# Patient Record
Sex: FEMALE | Race: ASIAN | NOT HISPANIC OR LATINO | Employment: STUDENT | URBAN - METROPOLITAN AREA
[De-identification: names, ages, dates, MRNs, and addresses within clinical notes are randomized per-mention and may not be internally consistent; named-entity substitution may affect disease eponyms.]

---

## 2024-04-23 ENCOUNTER — HOSPITAL ENCOUNTER (OUTPATIENT)
Facility: HOSPITAL | Age: 19
Setting detail: OBSERVATION
Discharge: HOME | End: 2024-04-24
Attending: EMERGENCY MEDICINE | Admitting: STUDENT IN AN ORGANIZED HEALTH CARE EDUCATION/TRAINING PROGRAM
Payer: COMMERCIAL

## 2024-04-23 DIAGNOSIS — T50.902A INTENTIONAL DRUG OVERDOSE, INITIAL ENCOUNTER: Primary | ICD-10-CM

## 2024-04-23 PROCEDURE — 99285 EMERGENCY DEPT VISIT HI MDM: CPT

## 2024-04-23 RX ORDER — ESCITALOPRAM OXALATE 20 MG/1
1 TABLET ORAL DAILY
COMMUNITY
Start: 2024-03-26

## 2024-04-23 RX ORDER — ONDANSETRON 2 MG/ML
4 INJECTION INTRAMUSCULAR; INTRAVENOUS ONCE
Status: COMPLETED | OUTPATIENT
Start: 2024-04-24 | End: 2024-04-24

## 2024-04-24 VITALS
OXYGEN SATURATION: 96 % | TEMPERATURE: 98 F | RESPIRATION RATE: 18 BRPM | BODY MASS INDEX: 19.16 KG/M2 | SYSTOLIC BLOOD PRESSURE: 128 MMHG | DIASTOLIC BLOOD PRESSURE: 87 MMHG | HEIGHT: 65 IN | WEIGHT: 115 LBS | HEART RATE: 83 BPM

## 2024-04-24 PROBLEM — D50.9 IRON DEFICIENCY ANEMIA: Status: ACTIVE | Noted: 2024-04-24

## 2024-04-24 PROBLEM — T50.902A INTENTIONAL DRUG OVERDOSE: Status: ACTIVE | Noted: 2024-04-24

## 2024-04-24 LAB
ALBUMIN SERPL-MCNC: 3.9 G/DL (ref 3.5–5.2)
ALBUMIN/GLOB SERPL: 1.7 G/DL
ALP SERPL-CCNC: 44 U/L (ref 39–117)
ALT SERPL W P-5'-P-CCNC: 11 U/L (ref 1–33)
AMPHET+METHAMPHET UR QL: NEGATIVE
ANION GAP SERPL CALCULATED.3IONS-SCNC: 10 MMOL/L (ref 5–15)
ANION GAP SERPL CALCULATED.3IONS-SCNC: 10.8 MMOL/L (ref 5–15)
APAP SERPL-MCNC: <5 MCG/ML (ref 0–30)
AST SERPL-CCNC: 15 U/L (ref 1–32)
BARBITURATES UR QL SCN: NEGATIVE
BASOPHILS # BLD AUTO: 0.02 10*3/MM3 (ref 0–0.2)
BASOPHILS # BLD AUTO: 0.03 10*3/MM3 (ref 0–0.2)
BASOPHILS NFR BLD AUTO: 0.3 % (ref 0–1.5)
BASOPHILS NFR BLD AUTO: 0.5 % (ref 0–1.5)
BENZODIAZ UR QL SCN: NEGATIVE
BILIRUB SERPL-MCNC: 0.2 MG/DL (ref 0–1.2)
BUN SERPL-MCNC: 6 MG/DL (ref 6–20)
BUN SERPL-MCNC: 7 MG/DL (ref 6–20)
BUN/CREAT SERPL: 12.8 (ref 7–25)
BUN/CREAT SERPL: 15.6 (ref 7–25)
CALCIUM SPEC-SCNC: 7.7 MG/DL (ref 8.6–10.5)
CALCIUM SPEC-SCNC: 8.3 MG/DL (ref 8.6–10.5)
CANNABINOIDS SERPL QL: NEGATIVE
CHLORIDE SERPL-SCNC: 106 MMOL/L (ref 98–107)
CHLORIDE SERPL-SCNC: 108 MMOL/L (ref 98–107)
CO2 SERPL-SCNC: 22.2 MMOL/L (ref 22–29)
CO2 SERPL-SCNC: 24 MMOL/L (ref 22–29)
COCAINE UR QL: NEGATIVE
CREAT SERPL-MCNC: 0.45 MG/DL (ref 0.57–1)
CREAT SERPL-MCNC: 0.47 MG/DL (ref 0.57–1)
DEPRECATED RDW RBC AUTO: 38.2 FL (ref 37–54)
DEPRECATED RDW RBC AUTO: 38.9 FL (ref 37–54)
EGFRCR SERPLBLD CKD-EPI 2021: 140.8 ML/MIN/1.73
EGFRCR SERPLBLD CKD-EPI 2021: 142.3 ML/MIN/1.73
EOSINOPHIL # BLD AUTO: 0.04 10*3/MM3 (ref 0–0.4)
EOSINOPHIL # BLD AUTO: 0.06 10*3/MM3 (ref 0–0.4)
EOSINOPHIL NFR BLD AUTO: 0.6 % (ref 0.3–6.2)
EOSINOPHIL NFR BLD AUTO: 1 % (ref 0.3–6.2)
ERYTHROCYTE [DISTWIDTH] IN BLOOD BY AUTOMATED COUNT: 11.7 % (ref 12.3–15.4)
ERYTHROCYTE [DISTWIDTH] IN BLOOD BY AUTOMATED COUNT: 11.7 % (ref 12.3–15.4)
ETHANOL BLD-MCNC: <10 MG/DL (ref 0–10)
ETHANOL UR QL: <0.01 %
FENTANYL UR-MCNC: NEGATIVE NG/ML
GLOBULIN UR ELPH-MCNC: 2.3 GM/DL
GLUCOSE SERPL-MCNC: 79 MG/DL (ref 65–99)
GLUCOSE SERPL-MCNC: 87 MG/DL (ref 65–99)
HCG SERPL QL: NEGATIVE
HCT VFR BLD AUTO: 33.9 % (ref 34–46.6)
HCT VFR BLD AUTO: 36.5 % (ref 34–46.6)
HGB BLD-MCNC: 11.4 G/DL (ref 12–15.9)
HGB BLD-MCNC: 12.5 G/DL (ref 12–15.9)
IMM GRANULOCYTES # BLD AUTO: 0.01 10*3/MM3 (ref 0–0.05)
IMM GRANULOCYTES # BLD AUTO: 0.01 10*3/MM3 (ref 0–0.05)
IMM GRANULOCYTES NFR BLD AUTO: 0.2 % (ref 0–0.5)
IMM GRANULOCYTES NFR BLD AUTO: 0.2 % (ref 0–0.5)
LYMPHOCYTES # BLD AUTO: 2.59 10*3/MM3 (ref 0.7–3.1)
LYMPHOCYTES # BLD AUTO: 2.67 10*3/MM3 (ref 0.7–3.1)
LYMPHOCYTES NFR BLD AUTO: 40.7 % (ref 19.6–45.3)
LYMPHOCYTES NFR BLD AUTO: 44.6 % (ref 19.6–45.3)
MAGNESIUM SERPL-MCNC: 1.8 MG/DL (ref 1.7–2.2)
MAGNESIUM SERPL-MCNC: 2 MG/DL (ref 1.7–2.2)
MCH RBC QN AUTO: 30.6 PG (ref 26.6–33)
MCH RBC QN AUTO: 30.8 PG (ref 26.6–33)
MCHC RBC AUTO-ENTMCNC: 33.6 G/DL (ref 31.5–35.7)
MCHC RBC AUTO-ENTMCNC: 34.2 G/DL (ref 31.5–35.7)
MCV RBC AUTO: 89.9 FL (ref 79–97)
MCV RBC AUTO: 90.9 FL (ref 79–97)
METHADONE UR QL SCN: NEGATIVE
MONOCYTES # BLD AUTO: 0.44 10*3/MM3 (ref 0.1–0.9)
MONOCYTES # BLD AUTO: 0.56 10*3/MM3 (ref 0.1–0.9)
MONOCYTES NFR BLD AUTO: 7.3 % (ref 5–12)
MONOCYTES NFR BLD AUTO: 8.8 % (ref 5–12)
NEUTROPHILS NFR BLD AUTO: 2.79 10*3/MM3 (ref 1.7–7)
NEUTROPHILS NFR BLD AUTO: 3.13 10*3/MM3 (ref 1.7–7)
NEUTROPHILS NFR BLD AUTO: 46.6 % (ref 42.7–76)
NEUTROPHILS NFR BLD AUTO: 49.2 % (ref 42.7–76)
NRBC BLD AUTO-RTO: 0 /100 WBC (ref 0–0.2)
NRBC BLD AUTO-RTO: 0 /100 WBC (ref 0–0.2)
OPIATES UR QL: NEGATIVE
OXYCODONE UR QL SCN: NEGATIVE
PLATELET # BLD AUTO: 215 10*3/MM3 (ref 140–450)
PLATELET # BLD AUTO: 240 10*3/MM3 (ref 140–450)
PMV BLD AUTO: 10 FL (ref 6–12)
PMV BLD AUTO: 9.9 FL (ref 6–12)
POTASSIUM SERPL-SCNC: 3.5 MMOL/L (ref 3.5–5.2)
POTASSIUM SERPL-SCNC: 3.6 MMOL/L (ref 3.5–5.2)
PROT SERPL-MCNC: 6.2 G/DL (ref 6–8.5)
QT INTERVAL: 412 MS
QT INTERVAL: 453 MS
QTC INTERVAL: 502 MS
QTC INTERVAL: 503 MS
RBC # BLD AUTO: 3.73 10*6/MM3 (ref 3.77–5.28)
RBC # BLD AUTO: 4.06 10*6/MM3 (ref 3.77–5.28)
SALICYLATES SERPL-MCNC: <0.3 MG/DL
SODIUM SERPL-SCNC: 139 MMOL/L (ref 136–145)
SODIUM SERPL-SCNC: 142 MMOL/L (ref 136–145)
WBC NRBC COR # BLD AUTO: 5.99 10*3/MM3 (ref 3.4–10.8)
WBC NRBC COR # BLD AUTO: 6.36 10*3/MM3 (ref 3.4–10.8)

## 2024-04-24 PROCEDURE — 25810000003 SODIUM CHLORIDE 0.9 % SOLUTION: Performed by: PHYSICIAN ASSISTANT

## 2024-04-24 PROCEDURE — 90791 PSYCH DIAGNOSTIC EVALUATION: CPT

## 2024-04-24 PROCEDURE — 80307 DRUG TEST PRSMV CHEM ANLYZR: CPT | Performed by: PHYSICIAN ASSISTANT

## 2024-04-24 PROCEDURE — 93010 ELECTROCARDIOGRAM REPORT: CPT | Performed by: INTERNAL MEDICINE

## 2024-04-24 PROCEDURE — 93005 ELECTROCARDIOGRAM TRACING: CPT | Performed by: NURSE PRACTITIONER

## 2024-04-24 PROCEDURE — G0378 HOSPITAL OBSERVATION PER HR: HCPCS

## 2024-04-24 PROCEDURE — 80053 COMPREHEN METABOLIC PANEL: CPT | Performed by: PHYSICIAN ASSISTANT

## 2024-04-24 PROCEDURE — 93005 ELECTROCARDIOGRAM TRACING: CPT | Performed by: PHYSICIAN ASSISTANT

## 2024-04-24 PROCEDURE — 85025 COMPLETE CBC W/AUTO DIFF WBC: CPT | Performed by: NURSE PRACTITIONER

## 2024-04-24 PROCEDURE — 80179 DRUG ASSAY SALICYLATE: CPT | Performed by: PHYSICIAN ASSISTANT

## 2024-04-24 PROCEDURE — 84703 CHORIONIC GONADOTROPIN ASSAY: CPT | Performed by: PHYSICIAN ASSISTANT

## 2024-04-24 PROCEDURE — 96374 THER/PROPH/DIAG INJ IV PUSH: CPT

## 2024-04-24 PROCEDURE — 85025 COMPLETE CBC W/AUTO DIFF WBC: CPT | Performed by: PHYSICIAN ASSISTANT

## 2024-04-24 PROCEDURE — 93005 ELECTROCARDIOGRAM TRACING: CPT | Performed by: STUDENT IN AN ORGANIZED HEALTH CARE EDUCATION/TRAINING PROGRAM

## 2024-04-24 PROCEDURE — 25810000003 SODIUM CHLORIDE 0.9 % SOLUTION: Performed by: NURSE PRACTITIONER

## 2024-04-24 PROCEDURE — 82077 ASSAY SPEC XCP UR&BREATH IA: CPT | Performed by: PHYSICIAN ASSISTANT

## 2024-04-24 PROCEDURE — 36415 COLL VENOUS BLD VENIPUNCTURE: CPT | Performed by: NURSE PRACTITIONER

## 2024-04-24 PROCEDURE — 80143 DRUG ASSAY ACETAMINOPHEN: CPT | Performed by: PHYSICIAN ASSISTANT

## 2024-04-24 PROCEDURE — 25010000002 MAGNESIUM SULFATE 2 GM/50ML SOLUTION: Performed by: STUDENT IN AN ORGANIZED HEALTH CARE EDUCATION/TRAINING PROGRAM

## 2024-04-24 PROCEDURE — 83735 ASSAY OF MAGNESIUM: CPT | Performed by: PHYSICIAN ASSISTANT

## 2024-04-24 PROCEDURE — 83735 ASSAY OF MAGNESIUM: CPT | Performed by: STUDENT IN AN ORGANIZED HEALTH CARE EDUCATION/TRAINING PROGRAM

## 2024-04-24 PROCEDURE — 25010000002 ONDANSETRON PER 1 MG: Performed by: PHYSICIAN ASSISTANT

## 2024-04-24 PROCEDURE — 96361 HYDRATE IV INFUSION ADD-ON: CPT

## 2024-04-24 RX ORDER — POLYETHYLENE GLYCOL 3350 17 G/17G
17 POWDER, FOR SOLUTION ORAL DAILY PRN
Status: DISCONTINUED | OUTPATIENT
Start: 2024-04-24 | End: 2024-04-24 | Stop reason: HOSPADM

## 2024-04-24 RX ORDER — POTASSIUM CHLORIDE 750 MG/1
40 TABLET, FILM COATED, EXTENDED RELEASE ORAL ONCE
Status: COMPLETED | OUTPATIENT
Start: 2024-04-24 | End: 2024-04-24

## 2024-04-24 RX ORDER — SODIUM CHLORIDE 9 MG/ML
40 INJECTION, SOLUTION INTRAVENOUS AS NEEDED
Status: DISCONTINUED | OUTPATIENT
Start: 2024-04-24 | End: 2024-04-24 | Stop reason: HOSPADM

## 2024-04-24 RX ORDER — MAGNESIUM SULFATE HEPTAHYDRATE 40 MG/ML
2 INJECTION, SOLUTION INTRAVENOUS ONCE
Status: COMPLETED | OUTPATIENT
Start: 2024-04-24 | End: 2024-04-24

## 2024-04-24 RX ORDER — AMOXICILLIN 250 MG
2 CAPSULE ORAL 2 TIMES DAILY PRN
Status: DISCONTINUED | OUTPATIENT
Start: 2024-04-24 | End: 2024-04-24 | Stop reason: HOSPADM

## 2024-04-24 RX ORDER — ACETAMINOPHEN 650 MG/1
650 SUPPOSITORY RECTAL EVERY 4 HOURS PRN
Status: DISCONTINUED | OUTPATIENT
Start: 2024-04-24 | End: 2024-04-24 | Stop reason: HOSPADM

## 2024-04-24 RX ORDER — SODIUM CHLORIDE 9 MG/ML
100 INJECTION, SOLUTION INTRAVENOUS CONTINUOUS
Status: DISCONTINUED | OUTPATIENT
Start: 2024-04-24 | End: 2024-04-24 | Stop reason: HOSPADM

## 2024-04-24 RX ORDER — ACETAMINOPHEN 325 MG/1
650 TABLET ORAL EVERY 4 HOURS PRN
Status: DISCONTINUED | OUTPATIENT
Start: 2024-04-24 | End: 2024-04-24 | Stop reason: HOSPADM

## 2024-04-24 RX ORDER — BISACODYL 5 MG/1
5 TABLET, DELAYED RELEASE ORAL DAILY PRN
Status: DISCONTINUED | OUTPATIENT
Start: 2024-04-24 | End: 2024-04-24 | Stop reason: HOSPADM

## 2024-04-24 RX ORDER — SODIUM CHLORIDE 0.9 % (FLUSH) 0.9 %
10 SYRINGE (ML) INJECTION EVERY 12 HOURS SCHEDULED
Status: DISCONTINUED | OUTPATIENT
Start: 2024-04-24 | End: 2024-04-24 | Stop reason: HOSPADM

## 2024-04-24 RX ORDER — ACETAMINOPHEN 160 MG/5ML
650 SOLUTION ORAL EVERY 4 HOURS PRN
Status: DISCONTINUED | OUTPATIENT
Start: 2024-04-24 | End: 2024-04-24 | Stop reason: HOSPADM

## 2024-04-24 RX ORDER — SODIUM CHLORIDE 0.9 % (FLUSH) 0.9 %
10 SYRINGE (ML) INJECTION AS NEEDED
Status: DISCONTINUED | OUTPATIENT
Start: 2024-04-24 | End: 2024-04-24 | Stop reason: HOSPADM

## 2024-04-24 RX ORDER — NITROGLYCERIN 0.4 MG/1
0.4 TABLET SUBLINGUAL
Status: DISCONTINUED | OUTPATIENT
Start: 2024-04-24 | End: 2024-04-24 | Stop reason: HOSPADM

## 2024-04-24 RX ORDER — BISACODYL 10 MG
10 SUPPOSITORY, RECTAL RECTAL DAILY PRN
Status: DISCONTINUED | OUTPATIENT
Start: 2024-04-24 | End: 2024-04-24 | Stop reason: HOSPADM

## 2024-04-24 RX ORDER — ONDANSETRON 2 MG/ML
4 INJECTION INTRAMUSCULAR; INTRAVENOUS EVERY 6 HOURS PRN
Status: DISCONTINUED | OUTPATIENT
Start: 2024-04-24 | End: 2024-04-24 | Stop reason: HOSPADM

## 2024-04-24 RX ORDER — ESCITALOPRAM OXALATE 20 MG/1
20 TABLET ORAL NIGHTLY
Status: DISCONTINUED | OUTPATIENT
Start: 2024-04-24 | End: 2024-04-24 | Stop reason: HOSPADM

## 2024-04-24 RX ADMIN — Medication 10 ML: at 09:27

## 2024-04-24 RX ADMIN — ONDANSETRON 4 MG: 2 INJECTION INTRAMUSCULAR; INTRAVENOUS at 00:08

## 2024-04-24 RX ADMIN — SODIUM CHLORIDE 100 ML/HR: 9 INJECTION, SOLUTION INTRAVENOUS at 03:33

## 2024-04-24 RX ADMIN — POTASSIUM CHLORIDE 40 MEQ: 750 TABLET, EXTENDED RELEASE ORAL at 15:49

## 2024-04-24 RX ADMIN — MAGNESIUM SULFATE HEPTAHYDRATE 2 G: 40 INJECTION, SOLUTION INTRAVENOUS at 15:55

## 2024-04-24 RX ADMIN — SODIUM CHLORIDE 1000 ML: 9 INJECTION, SOLUTION INTRAVENOUS at 00:08

## 2024-04-24 NOTE — ED NOTES
Nursing report ED to floor  Marva Wilkins  18 y.o.  female    HPI :  HPI (Adult)  Stated Reason for Visit: suicide attempt, overdose Lexapro    Chief Complaint  Chief Complaint   Patient presents with    Suicide Attempt    Drug Overdose    Marva Wilkins is a 18 y.o. female with a medical history of anxiety depression presents emergency department with an intentional drug overdose on Lexapro.  Patient reports that she had an argument with her boyfriend which triggered thoughts of abandonment and made her feel like she was going to end up alone so she attempted to hurt herself/end her life by taking 25 tablets of 20 mg Lexapro at 2200.  About 20 minutes postingestion she induced vomiting.  She says she still feels nauseated but has not vomited since then.  This is her first suicide attempt.  She has never been hospitalized for her mental illness previously.  She denies chest pain, shortness of air, or abdominal pain.  She denies homicidal ideations.  She denies any other drug or alcohol use.     Admitting doctor:   Roni Beauchamp DO    Admitting diagnosis:   The encounter diagnosis was Intentional drug overdose, initial encounter.    Code status:   Current Code Status       Date Active Code Status Order ID Comments User Context       4/24/2024 0239 CPR (Attempt to Resuscitate) [id number]  Carmen Perez, APRN ED        Question Answer    Code Status (Patient has no pulse and is not breathing) CPR (Attempt to Resuscitate)    Medical Interventions (Patient has pulse or is breathing) Full Support                    Allergies:   Patient has no known allergies.    Isolation:   No active isolations    Intake and Output  No intake or output data in the 24 hours ending 04/24/24 0337    Weight:       04/23/24  2336   Weight: 52.2 kg (115 lb)       Most recent vitals:   Vitals:    04/24/24 0132 04/24/24 0201 04/24/24 0231 04/24/24 0301   BP: 116/76 115/79 127/84 112/76   Pulse: 80 82 82 79   Resp:       Temp:       TempSrc:        SpO2: 98% 98% 97% 92%   Weight:       Height:           Active LDAs/IV Access:   Lines, Drains & Airways       Active LDAs       Name Placement date Placement time Site Days    Peripheral IV 04/23/24 Left Antecubital 04/23/24  --  Antecubital  1                    Labs (abnormal labs have a star):   Labs Reviewed   COMPREHENSIVE METABOLIC PANEL - Abnormal; Notable for the following components:       Result Value    Creatinine 0.45 (*)     Chloride 108 (*)     Calcium 7.7 (*)     All other components within normal limits    Narrative:     GFR Normal >60  Chronic Kidney Disease <60  Kidney Failure <15     CBC WITH AUTO DIFFERENTIAL - Abnormal; Notable for the following components:    RDW 11.7 (*)     All other components within normal limits   HCG, SERUM, QUALITATIVE - Normal   URINE DRUG SCREEN - Normal    Narrative:     Negative Thresholds Per Drugs Screened:    Amphetamines                 500 ng/ml  Barbiturates                 200 ng/ml  Benzodiazepines              100 ng/ml  Cocaine                      300 ng/ml  Methadone                    300 ng/ml  Opiates                      300 ng/ml  Oxycodone                    100 ng/ml  THC                           50 ng/ml  Fentanyl                       5 ng/ml      The Normal Value for all drugs tested is negative. This report includes final unconfirmed screening results to be used for medical treatment purposes only. Unconfirmed results must not be used for non-medical purposes such as employment or legal testing. Clinical consideration should be applied to any drug of abuse test, particularly when unconfirmed results are used.           ACETAMINOPHEN LEVEL - Normal   MAGNESIUM - Normal   SALICYLATE LEVEL - Normal    Narrative:     Therapeutic range for Salicylates:  3.0 - 10.0 mg/dL for antipyretic/analgesic conditions  15.0 - 30.0 mg/dL for anti-inflammatory conditions   ETHANOL   BASIC METABOLIC PANEL   CBC WITH AUTO DIFFERENTIAL   CBC AND DIFFERENTIAL     Narrative:     The following orders were created for panel order CBC & Differential.  Procedure                               Abnormality         Status                     ---------                               -----------         ------                     CBC Auto Differential[[id number]]        Abnormal            Final result                 Please view results for these tests on the individual orders.       EKG:   ECG 12 Lead Drug Monitoring   Preliminary Result   HEART RATE= 89  bpm   RR Interval= 674  ms   NH Interval= 168  ms   P Horizontal Axis= -30  deg   P Front Axis= 33  deg   QRSD Interval= 96  ms   QT Interval= 412  ms   QTcB= 502  ms   QRS Axis= 90  deg   T Wave Axis= 0  deg   - ABNORMAL ECG -   Sinus rhythm   Consider right ventricular hypertrophy   Nonspecific T abnormalities, anterior leads   Prolonged QT interval   Electronically Signed By:    Date and Time of Study: 2024-04-24 00:11:27      ECG 12 Lead Drug Monitoring    (Results Pending)       Meds given in ED:   Medications   sodium chloride 0.9 % flush 10 mL (has no administration in time range)   sodium chloride 0.9 % flush 10 mL (has no administration in time range)   sodium chloride 0.9 % infusion 40 mL (has no administration in time range)   nitroglycerin (NITROSTAT) SL tablet 0.4 mg (has no administration in time range)   sodium chloride 0.9 % infusion (100 mL/hr Intravenous New Bag 4/24/24 0333)   acetaminophen (TYLENOL) tablet 650 mg (has no administration in time range)     Or   acetaminophen (TYLENOL) 160 MG/5ML oral solution 650 mg (has no administration in time range)     Or   acetaminophen (TYLENOL) suppository 650 mg (has no administration in time range)   sennosides-docusate (PERICOLACE) 8.6-50 MG per tablet 2 tablet (has no administration in time range)     And   polyethylene glycol (MIRALAX) packet 17 g (has no administration in time range)     And   bisacodyl (DULCOLAX) EC tablet 5 mg (has no administration in time range)      And   bisacodyl (DULCOLAX) suppository 10 mg (has no administration in time range)   ondansetron (ZOFRAN) injection 4 mg (has no administration in time range)   sodium chloride 0.9 % bolus 1,000 mL (0 mL Intravenous Stopped 4/24/24 0324)   ondansetron (ZOFRAN) injection 4 mg (4 mg Intravenous Given 4/24/24 0008)       Imaging results:  No radiology results for the last day    Ambulatory status:   - adlib    Social issues:   Social History     Socioeconomic History    Marital status: Single   Tobacco Use    Smoking status: Never       Peripheral Neurovascular  Peripheral Neurovascular (Adult)  Peripheral Neurovascular WDL: WDL    Neuro Cognitive  Neuro Cognitive (Adult)  Cognitive/Neuro/Behavioral WDL: WDL    Learning  Learning Assessment (Adult)  Learning Readiness and Ability: no barriers identified    Respiratory  Respiratory (Adult)  Airway WDL: WDL  Respiratory WDL  Respiratory WDL: WDL    Abdominal Pain       Pain Assessments  Pain (Adult)  (0-10) Pain Rating: Rest: 0    NIH Stroke Scale       Carolina Paez RN  04/24/24 03:37 EDT

## 2024-04-24 NOTE — ED PROVIDER NOTES
EMERGENCY DEPARTMENT ENCOUNTER  Room Number:  03/03  PCP: Reji Mckeon MD  Independent Historians: Patient      HPI:  Chief Complaint: had concerns including Suicide Attempt and Drug Overdose.     A complete HPI/ROS/PMH/PSH/SH/FH are unobtainable due to: None    Chronic or social conditions impacting patient care (Social Determinants of Health): None      Context: Marva Wilkins is a 18 y.o. female with a medical history of anxiety depression presents emergency department with an intentional drug overdose on Lexapro.  Patient reports that she had an argument with her boyfriend which triggered thoughts of abandonment and made her feel like she was going to end up alone so she attempted to hurt herself/end her life by taking 25 tablets of 20 mg Lexapro at 2200.  About 20 minutes postingestion she induced vomiting.  She says she still feels nauseated but has not vomited since then.  This is her first suicide attempt.  She has never been hospitalized for her mental illness previously.  She denies chest pain, shortness of air, or abdominal pain.  She denies homicidal ideations.  She denies any other drug or alcohol use.        PAST MEDICAL HISTORY  Active Ambulatory Problems     Diagnosis Date Noted    No Active Ambulatory Problems     Resolved Ambulatory Problems     Diagnosis Date Noted    No Resolved Ambulatory Problems     No Additional Past Medical History         PAST SURGICAL HISTORY  History reviewed. No pertinent surgical history.      FAMILY HISTORY  History reviewed. No pertinent family history.      SOCIAL HISTORY  Social History     Socioeconomic History    Marital status: Single   Tobacco Use    Smoking status: Never         ALLERGIES  Patient has no known allergies.      REVIEW OF SYSTEMS  Included in HPI  All systems reviewed and negative except for those discussed in HPI.      PHYSICAL EXAM    I have reviewed the triage vital signs and nursing notes.    ED Triage Vitals [04/23/24 2336]   Temp Heart  Rate Resp BP SpO2   98.5 °F (36.9 °C) 102 18 140/91 100 %      Temp src Heart Rate Source Patient Position BP Location FiO2 (%)   Oral -- -- -- --       Physical Exam  Constitutional:       General: She is not in acute distress.     Appearance: Normal appearance.      Comments: No psychomotor agitation or depression observed.   HENT:      Head: Normocephalic and atraumatic.      Nose: Nose normal.      Mouth/Throat:      Mouth: Mucous membranes are moist.   Eyes:      Extraocular Movements: Extraocular movements intact.      Pupils: Pupils are equal, round, and reactive to light.   Cardiovascular:      Rate and Rhythm: Normal rate and regular rhythm.      Pulses: Normal pulses.      Heart sounds: Normal heart sounds.   Pulmonary:      Effort: Pulmonary effort is normal. No respiratory distress.      Breath sounds: Normal breath sounds. No wheezing, rhonchi or rales.   Abdominal:      General: Abdomen is flat. There is no distension.      Palpations: Abdomen is soft.      Tenderness: There is no abdominal tenderness. There is no guarding or rebound.   Musculoskeletal:         General: Normal range of motion.      Cervical back: Normal range of motion and neck supple.   Skin:     General: Skin is warm and dry.      Capillary Refill: Capillary refill takes less than 2 seconds.   Neurological:      General: No focal deficit present.      Mental Status: She is alert and oriented to person, place, and time.   Psychiatric:      Comments: Flat affect, depressed mood, sitting comfortably in bed, not actively responding to internal stimuli, no psychomotor agitation or depression.           LAB RESULTS  Recent Results (from the past 24 hour(s))   hCG, Serum, Qualitative    Collection Time: 04/24/24 12:09 AM    Specimen: Blood   Result Value Ref Range    HCG Qualitative Negative Negative   Ethanol    Collection Time: 04/24/24 12:09 AM    Specimen: Blood   Result Value Ref Range    Ethanol <10 0 - 10 mg/dL    Ethanol % <0.010 %    Acetaminophen Level    Collection Time: 04/24/24 12:09 AM    Specimen: Blood   Result Value Ref Range    Acetaminophen <5.0 0.0 - 30.0 mcg/mL   CBC Auto Differential    Collection Time: 04/24/24 12:09 AM    Specimen: Blood   Result Value Ref Range    WBC 6.36 3.40 - 10.80 10*3/mm3    RBC 4.06 3.77 - 5.28 10*6/mm3    Hemoglobin 12.5 12.0 - 15.9 g/dL    Hematocrit 36.5 34.0 - 46.6 %    MCV 89.9 79.0 - 97.0 fL    MCH 30.8 26.6 - 33.0 pg    MCHC 34.2 31.5 - 35.7 g/dL    RDW 11.7 (L) 12.3 - 15.4 %    RDW-SD 38.2 37.0 - 54.0 fl    MPV 10.0 6.0 - 12.0 fL    Platelets 240 140 - 450 10*3/mm3    Neutrophil % 49.2 42.7 - 76.0 %    Lymphocyte % 40.7 19.6 - 45.3 %    Monocyte % 8.8 5.0 - 12.0 %    Eosinophil % 0.6 0.3 - 6.2 %    Basophil % 0.5 0.0 - 1.5 %    Immature Grans % 0.2 0.0 - 0.5 %    Neutrophils, Absolute 3.13 1.70 - 7.00 10*3/mm3    Lymphocytes, Absolute 2.59 0.70 - 3.10 10*3/mm3    Monocytes, Absolute 0.56 0.10 - 0.90 10*3/mm3    Eosinophils, Absolute 0.04 0.00 - 0.40 10*3/mm3    Basophils, Absolute 0.03 0.00 - 0.20 10*3/mm3    Immature Grans, Absolute 0.01 0.00 - 0.05 10*3/mm3    nRBC 0.0 0.0 - 0.2 /100 WBC   Magnesium    Collection Time: 04/24/24 12:09 AM    Specimen: Blood   Result Value Ref Range    Magnesium 1.8 1.7 - 2.2 mg/dL   Salicylate Level    Collection Time: 04/24/24 12:09 AM    Specimen: Blood   Result Value Ref Range    Salicylate <0.3 <=30.0 mg/dL   Urine Drug Screen - Urine, Clean Catch    Collection Time: 04/24/24 12:11 AM    Specimen: Urine, Clean Catch   Result Value Ref Range    Amphet/Methamphet, Screen Negative Negative    Barbiturates Screen, Urine Negative Negative    Benzodiazepine Screen, Urine Negative Negative    Cocaine Screen, Urine Negative Negative    Opiate Screen Negative Negative    THC, Screen, Urine Negative Negative    Methadone Screen, Urine Negative Negative    Oxycodone Screen, Urine Negative Negative    Fentanyl, Urine Negative Negative   ECG 12 Lead Drug Monitoring     Collection Time: 04/24/24 12:11 AM   Result Value Ref Range    QT Interval 412 ms    QTC Interval 502 ms   Comprehensive Metabolic Panel    Collection Time: 04/24/24  1:33 AM    Specimen: Blood   Result Value Ref Range    Glucose 79 65 - 99 mg/dL    BUN 7 6 - 20 mg/dL    Creatinine 0.45 (L) 0.57 - 1.00 mg/dL    Sodium 142 136 - 145 mmol/L    Potassium 3.5 3.5 - 5.2 mmol/L    Chloride 108 (H) 98 - 107 mmol/L    CO2 24.0 22.0 - 29.0 mmol/L    Calcium 7.7 (L) 8.6 - 10.5 mg/dL    Total Protein 6.2 6.0 - 8.5 g/dL    Albumin 3.9 3.5 - 5.2 g/dL    ALT (SGPT) 11 1 - 33 U/L    AST (SGOT) 15 1 - 32 U/L    Alkaline Phosphatase 44 39 - 117 U/L    Total Bilirubin 0.2 0.0 - 1.2 mg/dL    Globulin 2.3 gm/dL    A/G Ratio 1.7 g/dL    BUN/Creatinine Ratio 15.6 7.0 - 25.0    Anion Gap 10.0 5.0 - 15.0 mmol/L    eGFR 142.3 >60.0 mL/min/1.73           MEDICATIONS GIVEN IN ER  Medications   sodium chloride 0.9 % flush 10 mL (has no administration in time range)   sodium chloride 0.9 % flush 10 mL (has no administration in time range)   sodium chloride 0.9 % infusion 40 mL (has no administration in time range)   nitroglycerin (NITROSTAT) SL tablet 0.4 mg (has no administration in time range)   sodium chloride 0.9 % infusion (has no administration in time range)   acetaminophen (TYLENOL) tablet 650 mg (has no administration in time range)     Or   acetaminophen (TYLENOL) 160 MG/5ML oral solution 650 mg (has no administration in time range)     Or   acetaminophen (TYLENOL) suppository 650 mg (has no administration in time range)   sennosides-docusate (PERICOLACE) 8.6-50 MG per tablet 2 tablet (has no administration in time range)     And   polyethylene glycol (MIRALAX) packet 17 g (has no administration in time range)     And   bisacodyl (DULCOLAX) EC tablet 5 mg (has no administration in time range)     And   bisacodyl (DULCOLAX) suppository 10 mg (has no administration in time range)   ondansetron (ZOFRAN) injection 4 mg (has no  administration in time range)   sodium chloride 0.9 % bolus 1,000 mL (1,000 mL Intravenous New Bag 4/24/24 0008)   ondansetron (ZOFRAN) injection 4 mg (4 mg Intravenous Given 4/24/24 0008)           OUTPATIENT MEDICATION MANAGEMENT:  Current Facility-Administered Medications Ordered in Epic   Medication Dose Route Frequency Provider Last Rate Last Admin    acetaminophen (TYLENOL) tablet 650 mg  650 mg Oral Q4H PRN Carmen Perez APRN        Or    acetaminophen (TYLENOL) 160 MG/5ML oral solution 650 mg  650 mg Oral Q4H PRN Carmen Perez, APRN        Or    acetaminophen (TYLENOL) suppository 650 mg  650 mg Rectal Q4H PRN Carmen Perez, APRN        sennosides-docusate (PERICOLACE) 8.6-50 MG per tablet 2 tablet  2 tablet Oral BID PRN Carmen Perez APRN        And    polyethylene glycol (MIRALAX) packet 17 g  17 g Oral Daily PRN Carmen Perez APRN        And    bisacodyl (DULCOLAX) EC tablet 5 mg  5 mg Oral Daily PRN Carmen Perez APRN        And    bisacodyl (DULCOLAX) suppository 10 mg  10 mg Rectal Daily PRN Carmen Perez, APRN        nitroglycerin (NITROSTAT) SL tablet 0.4 mg  0.4 mg Sublingual Q5 Min PRN Carmen Perez, APRN        ondansetron (ZOFRAN) injection 4 mg  4 mg Intravenous Q6H PRN Carmen Perez, APRN        sodium chloride 0.9 % flush 10 mL  10 mL Intravenous Q12H Carmen Perez, APRN        sodium chloride 0.9 % flush 10 mL  10 mL Intravenous PRN Carmen Perez, APRN        sodium chloride 0.9 % infusion 40 mL  40 mL Intravenous PRN Carmen Perez, APRN        sodium chloride 0.9 % infusion  100 mL/hr Intravenous Continuous Carmen Perez APRN         Current Outpatient Medications Ordered in Epic   Medication Sig Dispense Refill    escitalopram (LEXAPRO) 20 MG tablet Take 1 tablet by mouth Daily.                 PROGRESS, DATA ANALYSIS, CONSULTS, AND MEDICAL DECISION MAKING  ORDERS PLACED DURING THIS VISIT:  Orders Placed This  Encounter   Procedures    Comprehensive Metabolic Panel    hCG, Serum, Qualitative    Urine Drug Screen - Urine, Clean Catch    Ethanol    Acetaminophen Level    CBC Auto Differential    Magnesium    Salicylate Level    Basic Metabolic Panel    CBC Auto Differential    Diet: Regular/House; Fluid Consistency: Thin (IDDSI 0)    Vital Signs    Intake & Output    Weigh Patient    Oral Care    Place Sequential Compression Device    Maintain Sequential Compression Device    Maintain IV Access    Telemetry - Place Orders & Notify Provider of Results When Patient Experiences Acute Chest Pain, Dysrhythmia or Respiratory Distress    May Be Off Telemetry for Tests    Continuous Pulse Oximetry    Up With Assistance    Neuro Checks    Code Status and Medical Interventions:    [facility] (on-call MD unless specified) Details    Psych / Access to see    Inpatient Access Center Consult    ECG 12 Lead Drug Monitoring    ECG 12 Lead Drug Monitoring    Insert Peripheral IV    Initiate Observation Status    Legal Status 72hr Hold    CBC & Differential       All labs have been independently interpreted by me.  All radiology studies have been reviewed by me. All EKG's have been independently viewed and interpreted by me.  Discussion below represents my analysis of pertinent findings related to patient's condition, differential diagnosis, treatment plan and final disposition.    Differential diagnosis includes but is not limited to:   Intentional drug overdose, electrolyte abnormality, prolonged QT interval    ED Course:  ED Course as of 04/24/24 0258   Tue Apr 23, 2024   3049 I discussed the case with Dr. Curiel and they agree to evaluate the patient at the bedside.    [CC]   Wed Apr 24, 2024   0008 I spoke with poison control and they recommended symptomatic management and an 11 hour observation time. They do not recommend activated charcoal at this time.  [CC]   0026 EKG performed at 0011 and interpreted by me: Normal sinus rhythm with a rate  of 89, normal axis, prolonged QT interval, nonspecific T wave changes in the anterior leads, no significant ST elevation or depression  [CC]   0211 Spoke with ROBERT Morales with [facility].  Reviewed history, exam, results, treatments.  She agrees admit the patient to Dr. Beauchamp.      [CC]      ED Course User Index  [CC] Karen Morocho PA-C           AS OF 02:58 EDT VITALS:    BP - 127/84  HR - 82  TEMP - 98.5 °F (36.9 °C) (Oral)  O2 SATS - 97%      MDM:  Patient is a well-appearing 18-year-old female presents emergency department today with an intentional medication overdose.  On arrival here in the emergency department vitals are reassuring, she is afebrile.  My exam the patient has a flat affect and depressed mood but is in no acute distress.  Patient was evaluated with labs which are overall reassuring.  EKG did reveal a mildly prolonged QT interval.  After speaking with poison control they recommended observation on telemetry for 11 hours.  They did not recommend givingactivated charcoal given that the patient had already vomited since ingestion.  Since the observation time is lengthy and she will need to be watched on telemetry, will admit to the hospital for psychiatric consultation.  Patient is on a 72-hour hold.  She is stable at the time of admission.      COMPLEXITY OF CARE  The patient requires admission.        DIAGNOSIS  Final diagnoses:   Intentional drug overdose, initial encounter         DISPOSITION  ED Disposition       ED Disposition   Decision to Admit    Condition   --    Comment   Level of Care: Telemetry [5]   Diagnosis: Intentional drug overdose [650044]   Admitting Physician: ZAIDA BEAUCHAMP [[id number]]   Attending Physician: ZAIDA BEAUCHAMP [[id number]]                      Please note that portions of this document were completed with a voice recognition program.    Note Disclaimer: At [facility], we believe that sharing information builds trust and better relationships. You are receiving this note  because you recently visited [facility]. It is possible you will see health information before a provider has talked with you about it. This kind of information can be easy to misunderstand. To help you fully understand what it means for your health, we urge you to discuss this note with your provider.     Karen Morocho PA-C  04/24/24 0259

## 2024-04-24 NOTE — H&P
Patient Name:  Marva Wilkins  YOB: 2005  MRN:  [id number]  Admit Date:  4/23/2024  Patient Care Team:  Reji Mckeon MD as PCP - General (Pediatrics)      Subjective   History Present Illness     Chief Complaint   Patient presents with    Suicide Attempt    Drug Overdose       This is a 18-year-old woman with no significant past medical history presents the hospital after an intentional ingestion of approximately 20 to 25 tablets of 20 mg Lexapro tablets.  She was having issues with her ex-boyfriend which led to this impulsive act.  She has no history of substance use disorder.    GENERAL: No change in appetite or weight;   No fevers, chills, sweats.    SKIN: No nonhealing lesions.   No rashes.  HEME/LYMPH: No easy bruising, bleeding.   No swollen nodes.   EYES: No vision changes or diplopia.   ENT: No tinnitus, hearing loss, gum bleeding, epistaxis, hoarseness or dysphagia.   RESPIRATORY: No cough, shortness of breath, hemoptysis or wheezing.   CVS: No chest pain, palpitations, orthopnea, dyspnea on exertion or PND.   GI: No melena or hematochezia.   No abdominal pain.  No nausea, vomiting, constipation, diarrhea  : No lower tract obstructive symptoms, dysuria or hematuria.   MUSCULOSKELETAL: No bone pain.  No joint stiffness.   NEUROLOGICAL: No global weakness, loss of consciousness or seizures.   PSYCHIATRIC: No increased nervousness, mood changes or depression.         Personal History     History reviewed. No pertinent past medical history.  History reviewed. No pertinent surgical history.  History reviewed. No pertinent family history.  Social History     Tobacco Use    Smoking status: Never   Vaping Use    Vaping status: Some Days    Substances: Nicotine    Devices: Disposable, Pre-filled or refillable cartridge, Refillable tank, Pre-filled pod    Passive vaping exposure: Yes   Substance Use Topics    Alcohol use: Yes     Alcohol/week: 5.0 standard drinks of alcohol     Types: 5 Cans  of beer per week    Drug use: Not Currently     No current facility-administered medications on file prior to encounter.     Current Outpatient Medications on File Prior to Encounter   Medication Sig Dispense Refill    escitalopram (LEXAPRO) 20 MG tablet Take 1 tablet by mouth Daily.       No Known Allergies    Objective    Objective     Vital Signs  Temp:  [98 °F (36.7 °C)-98.5 °F (36.9 °C)] 98 °F (36.7 °C)  Heart Rate:  [] 85  Resp:  [18] 18  BP: (105-140)/(72-94) 127/85  SpO2:  [92 %-100 %] 96 %  on   ;   Device (Oxygen Therapy): room air  Body mass index is 19.14 kg/m².    Physical Exam  General: Alert and oriented x3, no acute distress.  HEENT: Normocephalic, atraumatic  Eyes: PERRL, EOMI, anicteric sclera  Lungs: Clear to auscultation bilaterally, no crackles or wheezes  CV: Regular rate and rhythm, no murmurs rubs or gallops  Abdomen: Soft, nontender, nondistended.  Normoactive bowel sounds  Extremities: No significant peripheral edema  Skin: Clean/dry/intact, no rashes  Neuro: Cranial nerves II through XII intact, no gross focal neurological deficits appreciated  Psych: Appropriate mood and affect    Results Review:  I reviewed the patient's new clinical results.  I reviewed the patient's new imaging results and agree with the interpretation.  I reviewed the patient's other test results and agree with the interpretation  I personally viewed and interpreted the patient's EKG/Telemetry data  Discussed with ED provider.    Lab Results (last 24 hours)       Procedure Component Value Units Date/Time    CBC & Differential [[id number]]  (Abnormal) Collected: 04/24/24 0009    Specimen: Blood Updated: 04/24/24 0028    Narrative:      The following orders were created for panel order CBC & Differential.  Procedure                               Abnormality         Status                     ---------                               -----------         ------                     CBC Auto Differential[[id number]]         Abnormal            Final result                 Please view results for these tests on the individual orders.    hCG, Serum, Qualitative [[id number]]  (Normal) Collected: 04/24/24 0009    Specimen: Blood Updated: 04/24/24 0047     HCG Qualitative Negative    Ethanol [[id number]] Collected: 04/24/24 0009    Specimen: Blood Updated: 04/24/24 0054     Ethanol <10 mg/dL      Ethanol % <0.010 %     Acetaminophen Level [[id number]]  (Normal) Collected: 04/24/24 0009    Specimen: Blood Updated: 04/24/24 0054     Acetaminophen <5.0 mcg/mL     CBC Auto Differential [[id number]]  (Abnormal) Collected: 04/24/24 0009    Specimen: Blood Updated: 04/24/24 0028     WBC 6.36 10*3/mm3      RBC 4.06 10*6/mm3      Hemoglobin 12.5 g/dL      Hematocrit 36.5 %      MCV 89.9 fL      MCH 30.8 pg      MCHC 34.2 g/dL      RDW 11.7 %      RDW-SD 38.2 fl      MPV 10.0 fL      Platelets 240 10*3/mm3      Neutrophil % 49.2 %      Lymphocyte % 40.7 %      Monocyte % 8.8 %      Eosinophil % 0.6 %      Basophil % 0.5 %      Immature Grans % 0.2 %      Neutrophils, Absolute 3.13 10*3/mm3      Lymphocytes, Absolute 2.59 10*3/mm3      Monocytes, Absolute 0.56 10*3/mm3      Eosinophils, Absolute 0.04 10*3/mm3      Basophils, Absolute 0.03 10*3/mm3      Immature Grans, Absolute 0.01 10*3/mm3      nRBC 0.0 /100 WBC     Magnesium [[id number]]  (Normal) Collected: 04/24/24 0009    Specimen: Blood Updated: 04/24/24 0125     Magnesium 1.8 mg/dL     Salicylate Level [[id number]]  (Normal) Collected: 04/24/24 0009    Specimen: Blood Updated: 04/24/24 0054     Salicylate <0.3 mg/dL     Narrative:      Therapeutic range for Salicylates:  3.0 - 10.0 mg/dL for antipyretic/analgesic conditions  15.0 - 30.0 mg/dL for anti-inflammatory conditions    Urine Drug Screen - Urine, Clean Catch [[id number]]  (Normal) Collected: 04/24/24 0011    Specimen: Urine, Clean Catch Updated: 04/24/24 0049     Amphet/Methamphet, Screen Negative     Barbiturates Screen, Urine Negative      Benzodiazepine Screen, Urine Negative     Cocaine Screen, Urine Negative     Opiate Screen Negative     THC, Screen, Urine Negative     Methadone Screen, Urine Negative     Oxycodone Screen, Urine Negative     Fentanyl, Urine Negative    Narrative:      Negative Thresholds Per Drugs Screened:    Amphetamines                 500 ng/ml  Barbiturates                 200 ng/ml  Benzodiazepines              100 ng/ml  Cocaine                      300 ng/ml  Methadone                    300 ng/ml  Opiates                      300 ng/ml  Oxycodone                    100 ng/ml  THC                           50 ng/ml  Fentanyl                       5 ng/ml      The Normal Value for all drugs tested is negative. This report includes final unconfirmed screening results to be used for medical treatment purposes only. Unconfirmed results must not be used for non-medical purposes such as employment or legal testing. Clinical consideration should be applied to any drug of abuse test, particularly when unconfirmed results are used.            Comprehensive Metabolic Panel [[id number]]  (Abnormal) Collected: 04/24/24 0133    Specimen: Blood Updated: 04/24/24 0200     Glucose 79 mg/dL      BUN 7 mg/dL      Creatinine 0.45 mg/dL      Sodium 142 mmol/L      Potassium 3.5 mmol/L      Comment: Slight hemolysis detected by analyzer. Result may be falsely elevated.        Chloride 108 mmol/L      CO2 24.0 mmol/L      Calcium 7.7 mg/dL      Total Protein 6.2 g/dL      Albumin 3.9 g/dL      ALT (SGPT) 11 U/L      AST (SGOT) 15 U/L      Alkaline Phosphatase 44 U/L      Total Bilirubin 0.2 mg/dL      Globulin 2.3 gm/dL      A/G Ratio 1.7 g/dL      BUN/Creatinine Ratio 15.6     Anion Gap 10.0 mmol/L      eGFR 142.3 mL/min/1.73     Narrative:      GFR Normal >60  Chronic Kidney Disease <60  Kidney Failure <15      Basic Metabolic Panel [[id number]]  (Abnormal) Collected: 04/24/24 0709    Specimen: Blood Updated: 04/24/24 0834     Glucose 87 mg/dL       BUN 6 mg/dL      Creatinine 0.47 mg/dL      Sodium 139 mmol/L      Potassium 3.6 mmol/L      Comment: Slight hemolysis detected by analyzer. Result may be falsely elevated.        Chloride 106 mmol/L      CO2 22.2 mmol/L      Calcium 8.3 mg/dL      BUN/Creatinine Ratio 12.8     Anion Gap 10.8 mmol/L      eGFR 140.8 mL/min/1.73     Narrative:      GFR Normal >60  Chronic Kidney Disease <60  Kidney Failure <15      CBC Auto Differential [[id number]]  (Abnormal) Collected: 04/24/24 0709    Specimen: Blood Updated: 04/24/24 0816     WBC 5.99 10*3/mm3      RBC 3.73 10*6/mm3      Hemoglobin 11.4 g/dL      Hematocrit 33.9 %      MCV 90.9 fL      MCH 30.6 pg      MCHC 33.6 g/dL      RDW 11.7 %      RDW-SD 38.9 fl      MPV 9.9 fL      Platelets 215 10*3/mm3      Neutrophil % 46.6 %      Lymphocyte % 44.6 %      Monocyte % 7.3 %      Eosinophil % 1.0 %      Basophil % 0.3 %      Immature Grans % 0.2 %      Neutrophils, Absolute 2.79 10*3/mm3      Lymphocytes, Absolute 2.67 10*3/mm3      Monocytes, Absolute 0.44 10*3/mm3      Eosinophils, Absolute 0.06 10*3/mm3      Basophils, Absolute 0.02 10*3/mm3      Immature Grans, Absolute 0.01 10*3/mm3      nRBC 0.0 /100 WBC     Magnesium [[id number]]  (Normal) Collected: 04/24/24 0709    Specimen: Blood Updated: 04/24/24 0954     Magnesium 2.0 mg/dL             No results found for this or any previous visit.    Imaging Results (Last 24 Hours)       ** No results found for the last 24 hours. **                ECG 12 Lead Drug Monitoring   Preliminary Result   HEART RATE= 74  bpm   RR Interval= 811  ms   CA Interval= 179  ms   P Horizontal Axis= -68  deg   P Front Axis= 53  deg   QRSD Interval= 98  ms   QT Interval= 453  ms   QTcB= 503  ms   QRS Axis= 90  deg   T Wave Axis= 71  deg   - ABNORMAL ECG -   Sinus rhythm   Borderline right axis deviation   Prolonged QT interval   Electronically Signed By:    Date and Time of Study: 2024-04-24 05:37:47      ECG 12 Lead Drug Monitoring    Preliminary Result   HEART RATE= 89  bpm   RR Interval= 674  ms   OK Interval= 168  ms   P Horizontal Axis= -30  deg   P Front Axis= 33  deg   QRSD Interval= 96  ms   QT Interval= 412  ms   QTcB= 502  ms   QRS Axis= 90  deg   T Wave Axis= 0  deg   - ABNORMAL ECG -   Sinus rhythm   Consider right ventricular hypertrophy   Nonspecific T abnormalities, anterior leads   Prolonged QT interval   Electronically Signed By:    Date and Time of Study: 2024-04-24 00:11:27                 Assessment/Plan     Active Hospital Problems    Diagnosis  POA    **Intentional drug overdose [T50.902A]  Yes    Iron deficiency anemia [D50.9]  Unknown      Resolved Hospital Problems   No resolved problems to display.     Intentional drug overdose  This was a impulsive decision.  She does not have a history of substance use disorder.  He has been seen by psychiatry in consultation.  Intensive outpatient therapy was recommended.       Mike Gifford MD  [facility]  04/24/24  12:36 EDT

## 2024-04-24 NOTE — ED PROVIDER NOTES
MD ATTESTATION NOTE    The YANI and I have discussed this patient's history, physical exam, and treatment plan.  I have reviewed the documentation and personally had a face to face interaction with the patient. I affirm the documentation and agree with the treatment and plan.  The attached note describes my personal findings.      I provided a substantive portion of the care of the patient.  I personally performed the physical exam in its entirety, and below are my findings.       Brief HPI: This patient is a 18-year-old female with a history of anxiety and depression presenting to the emergency room today with an intentional overdose of her Lexapro.  She reports that she took approximately 25 tablets of her 20 mg Lexapro medicine at 10 PM tonight.  She denies any other coingestants.  She does report that she had associated vomiting after taking the medication.      PHYSICAL EXAM  ED Triage Vitals [04/23/24 2336]   Temp Heart Rate Resp BP SpO2   98.5 °F (36.9 °C) 102 18 140/91 100 %      Temp src Heart Rate Source Patient Position BP Location FiO2 (%)   Oral -- -- -- --         GENERAL: Resting comfortably and in no acute distress, nontoxic in appearance  HENT: nares patent  EYES: no scleral icterus  CV: regular rhythm, normal rate, no M/R/G  RESPIRATORY: normal effort, lungs clear bilaterally  ABDOMEN: soft, nontender, no rebound or guarding  MUSCULOSKELETAL: no deformity, no edema  NEURO: alert, moves all extremities, follows commands  PSYCH:  calm, cooperative  SKIN: warm, dry    Vital signs and nursing notes reviewed.        Plan: Discussions with poison control recommend at at least an 11-hour observation time given her her overdose.  We will obtain labs, placed on a 72-hour hold, and ultimately admit to the hospital for medical clearance as well as psychiatric assessment.      Laboratory values were independently interpreted by myself with my interpretation showing a normal CBC, CMP, as well as UDS.        Bernard Curiel MD  04/24/24 0334

## 2024-04-24 NOTE — PROGRESS NOTES
[facility]  [phone]    ACCESS CENTER STATEMENT OF DISPOSITION      I, Marva Wilkins, was assessed in the [facility] on 4/24/2024.  I understand the recommendations below and what follow-up action is expected of me.    Inpatient, Partial Hospitalization, and Intensive Outpatient Programs:  [facility] (Inpt, PHP, IOP)  [phone]               [facility] (Inpt, PHP, IOP)  [phone]      [facility] (Inpt, PHP, IOP)  [address] [place] [address]  [phone]     [facility] (IOP only)  [phone]     [facility] ([facility]   [phone]         ________________________________  Patient/Parent/Guardian/POA Signature    ________________________________  Clinician Signature    4/24/2024  03:20 EDT

## 2024-04-24 NOTE — CASE MANAGEMENT/SOCIAL WORK
Discharge Planning Assessment  [facility]     Patient Name: Marva Wilkins  MRN: [id number]  Today's Date: 4/24/2024    Admit Date: 4/23/2024    Plan: Plan is home with outpatient services   Discharge Needs Assessment       Row Name 04/24/24 1233       Living Environment    People in Home parent(s);grandparent(s)    Current Living Arrangements home    Potentially Unsafe Housing Conditions none    In the past 12 months has the electric, gas, oil, or water company threatened to shut off services in your home? No    Primary Care Provided by self    Provides Primary Care For no one    Family Caregiver if Needed grandparent(s);parent(s)    Quality of Family Relationships involved;supportive    Able to Return to Prior Arrangements yes       Resource/Environmental Concerns    Resource/Environmental Concerns none    Transportation Concerns none       Transportation Needs    In the past 12 months, has lack of transportation kept you from medical appointments or from getting medications? no    In the past 12 months, has lack of transportation kept you from meetings, work, or from getting things needed for daily living? No       Food Insecurity    Within the past 12 months, you worried that your food would run out before you got the money to buy more. Never true    Within the past 12 months, the food you bought just didn't last and you didn't have money to get more. Never true       Transition Planning    Patient/Family Anticipates Transition to home with family    Patient/Family Anticipated Services at Transition none    Transportation Anticipated family or friend will provide       Discharge Needs Assessment    Equipment Currently Used at Home none    Concerns to be Addressed discharge planning    Anticipated Changes Related to Illness none    Provided Post Acute Provider List? N/A    Provided Post Acute Provider Quality & Resource List? N/A                   Discharge Plan       Row Name 04/24/24 6935       Plan    Plan  Plan is home with outpatient services    Plan Comments CCP spoke to patients mother at bedside  The patient is asleep.  Pt is A&O, independent with ADL's.  SHe uses no DME to ambulate  Plan is home  Pt is followed by access.  CCP following for needs                  Continued Care and Services - Admitted Since 4/23/2024    No active coordination exists for this encounter.          Demographic Summary    No documentation.                  Functional Status    No documentation.                  Psychosocial    No documentation.                  Abuse/Neglect    No documentation.                  Legal    No documentation.                  Substance Abuse    No documentation.                  Patient Forms    No documentation.                     Vinita Henriquez RN
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
## 2024-04-24 NOTE — ED NOTES
"Karne HODGE spoke to poison control who reported     \" they recommended symptomatic management and an 11 hours observation time. They do not recommend activated charcoal at this time. \"          "

## 2024-04-25 LAB
QT INTERVAL: 434 MS
QTC INTERVAL: 495 MS

## 2024-04-26 ENCOUNTER — OFFICE VISIT (OUTPATIENT)
Dept: PSYCHIATRY | Facility: HOSPITAL | Age: 19
End: 2024-04-26
Payer: COMMERCIAL

## 2024-04-26 DIAGNOSIS — F33.1 MAJOR DEPRESSIVE DISORDER, RECURRENT EPISODE, MODERATE: Primary | ICD-10-CM

## 2024-04-26 PROCEDURE — S9480 INTENSIVE OUTPATIENT PSYCHIA: HCPCS | Performed by: SOCIAL WORKER

## 2024-04-26 NOTE — PROGRESS NOTES
Mood at 3-4 with 10 being the worse and found listening to others the most helpful.  Symptoms include:    Feeling sad or empty   Trouble with concentration, memory, or making decisions  Fatigue or loss of energy  Loss of interest in things you usually like to do  Easily tearing up/crying  Increased Irritabliliy  Easily distracted  An increase/decrease in normal appetite  Changes in normal sleep patterns (increase, decrease, or broken hoours of sleep)  Racing thoughts    No SI.  Appropriate goals.  Patient does not have a psychiatrist.  She saw Dr. Terrazas in the hospital and was comfortable with him.

## 2024-04-26 NOTE — TREATMENT PLAN
Multi-Disciplinary Problems (from Behavioral Health Treatment Plan)      Active Problems       Problem: Anxiety  Start Date: 04/26/24      Problem Details: The patient self-scales this problem as a 4 with 10 being the worst.          Goal Priority Start Date Expected End Date End Date    Patient will develop and implement behavioral and cognitive strategies to reduce anxiety and irrational fears. -- 04/26/24 -- --    Goal Details: Progress toward goal:  Not appropriate to rate progress toward goal since this is the initial treatment plan.          Goal Intervention Frequency Start Date End Date    Help patient explore past emotional issues in relation to present anxiety. Weekly 04/26/24 --    Intervention Details: Duration of treatment until until discharged.          Goal Intervention Frequency Start Date End Date    Help patient develop an awareness of their cognitive and physical responses to anxiety. Weekly 04/26/24 --    Intervention Details: Duration of treatment until until discharged.                  Problem: Depression  Start Date: 04/26/24      Problem Details: The patient self-scales this problem as a 4 with 10 being the worst.          Goal Priority Start Date Expected End Date End Date    Patient will demonstrate the ability to initiate new constructive life skills outside of sessions on a consistent basis. -- 04/26/24 -- --    Goal Details: Progress toward goal:  Not appropriate to rate progress toward goal since this is the initial treatment plan.          Goal Intervention Frequency Start Date End Date    Assist patient in setting attainable activities of daily living goals. PRN 04/26/24 --      Goal Intervention Frequency Start Date End Date    Provide education about depression Weekly 04/26/24 --    Intervention Details: Duration of treatment until until discharged.          Goal Intervention Frequency Start Date End Date    Assist patient in developing healthy coping strategies. Weekly 04/26/24  --    Intervention Details: Duration of treatment until until discharged.                          Reviewed By       Lion Barraza, LPAT 04/26/24 3637                     I have discussed and reviewed this treatment plan with the patient.  It has been printed for signatures.

## 2024-04-26 NOTE — PROGRESS NOTES
Mental Health Awareness Class   (10:45am-11:45am)  Information/activity     Forgiveness    Instructor Patricia Dickerson LCSW     13:05 EDT     Patient Response Quiet  Patient was quiet but attentive.  This was her first day in program.

## 2024-04-26 NOTE — PROGRESS NOTES
Psych IOP  Group note  Observations:    Engaged in Activity / Process and Self -disclosed: Yes  Applies Topic to self: Yes  Able to give Constructive Feedback: Yes  Affect: blunted and anxious  Degree of Insightful Thinking 4  Notes:  This was pt's first day in the group. Pt was open about recent overdose and described long-term depression and anxiety. Pt also related to peers about change of environments with family and friends as moved from different state. Pt voiced desire to stay busy and described plan to work part of the day today to see friends and not be under the control of family. Pt was able to accept peer feedback and support.      Lion Barraza LPAT

## 2024-04-26 NOTE — CASE MANAGEMENT/SOCIAL WORK
Case Management Discharge Note      Final Note: home no needs    Provided Post Acute Provider List?: N/A  Provided Post Acute Provider Quality & Resource List?: N/A    Selected Continued Care - Discharged on 4/24/2024 Admission date: 4/23/2024 - Discharge disposition: Home or Self Care      Destination    No services have been selected for the patient.                Durable Medical Equipment    No services have been selected for the patient.                Dialysis/Infusion    No services have been selected for the patient.                Home Medical Care    No services have been selected for the patient.                Therapy    No services have been selected for the patient.                Community Resources    No services have been selected for the patient.                Community & DME    No services have been selected for the patient.                    Transportation Services  Private: Car    Final Discharge Disposition Code: 01 - home or self-care

## 2024-04-29 ENCOUNTER — OFFICE VISIT (OUTPATIENT)
Dept: PSYCHIATRY | Facility: HOSPITAL | Age: 19
End: 2024-04-29
Payer: COMMERCIAL

## 2024-04-29 DIAGNOSIS — F33.1 MAJOR DEPRESSIVE DISORDER, RECURRENT EPISODE, MODERATE: Primary | ICD-10-CM

## 2024-04-29 PROCEDURE — S9480 INTENSIVE OUTPATIENT PSYCHIA: HCPCS | Performed by: SOCIAL WORKER

## 2024-04-29 NOTE — PROGRESS NOTES
Psych IOP  Group note  Observations:    Engaged in Activity / Process and Self -disclosed: Yes  Applies Topic to self: Yes  Able to give Constructive Feedback: No  Affect:  appropriate to situation   Degree of Insightful Thinking 6  Notes:  Pt engaged in morning reading prior to group and processed how she related to not always getting validation for hard work but recognizes the importance of validating self. Pt shared of feeling good about how hard she works at her job and how it helps to make others' job easier. Pt able to recognize acknowledgement of this in her tip out and from her manager on a really busy day. Pt mostly listened for the rest of group but remained attentive.       Kimberly Kelley, JOHNW

## 2024-04-29 NOTE — PROGRESS NOTES
Mood at 1 with 10 being the worse and found listening to others helpful.  Symptoms include:    Easily distracted  An increase/decrease in normal appetite  Changes in normal sleep patterns (increase, decrease, or broken hoours of sleep)    No SI.  Patient seems much brighter and looking forward to socializing over the weekend.

## 2024-04-29 NOTE — PROGRESS NOTES
Other     Information/activity     6901-1198 Depression - Gave handout of the PET scan of depressed and not depressed brain, showed the videos I had a black dog, his name was depression; and Living with a black dog. Processed the information with the group.    Instructor CLARA Mendoza     14:16 EDT     Patient Response Good participation  Pt was attentive to the material, shared own depression and how related to peers.

## 2024-04-30 ENCOUNTER — OFFICE VISIT (OUTPATIENT)
Dept: PSYCHIATRY | Facility: HOSPITAL | Age: 19
End: 2024-04-30
Payer: COMMERCIAL

## 2024-04-30 DIAGNOSIS — F33.1 MAJOR DEPRESSIVE DISORDER, RECURRENT EPISODE, MODERATE: Primary | ICD-10-CM

## 2024-04-30 PROCEDURE — S9480 INTENSIVE OUTPATIENT PSYCHIA: HCPCS | Performed by: SOCIAL WORKER

## 2024-04-30 NOTE — PROGRESS NOTES
Mental Health Awareness Class   (11:00am-11:45am)  Information/activity     Using the Serenity Prayer for Problem Solving    Instructor Patricia Dickerson LCSW     15:35 EDT     Patient Response Quiet  Patient was quiet but attentive.  Still new to the group.

## 2024-04-30 NOTE — PROGRESS NOTES
Mood at 1-2 with 10 being the worse and found listening to others and talking and being open about my feelings to be most helpful today.     Fatigue or loss of energy  Easily distracted  An increase/decrease in normal appetite  Changes in normal sleep patterns (increase, decrease, or broken hoours of sleep)    No SI and appropriate goals for the day.

## 2024-04-30 NOTE — PROGRESS NOTES
Psych IOP  Group note  Observations:    Engaged in Activity / Process and Self -disclosed: Yes  Applies Topic to self: Yes  Able to give Constructive Feedback: Yes  Affect:  appropriate to situation   Degree of Insightful Thinking 7  Notes:  Pt engaged and listened to peers. Pt shared of hanging out with some friends yesterday and being able to advocate for herself in sharing her feelings and the experience of her OD and going to the hospital. Pt shared that it felt good to express herself in that way as she would have normally held her thoughts and feelings in. Pt noted that the response from her friends was supportive and they were glad to hear her share more honestly. Pt related to peer about losing someone to suicide and shared the impact.       JOHN CisnerosW

## 2024-05-01 ENCOUNTER — OFFICE VISIT (OUTPATIENT)
Dept: PSYCHIATRY | Facility: HOSPITAL | Age: 19
End: 2024-05-01
Payer: COMMERCIAL

## 2024-05-01 DIAGNOSIS — F33.1 MAJOR DEPRESSIVE DISORDER, RECURRENT EPISODE, MODERATE: Primary | ICD-10-CM

## 2024-05-01 PROCEDURE — S9480 INTENSIVE OUTPATIENT PSYCHIA: HCPCS | Performed by: ART THERAPIST

## 2024-05-01 NOTE — PROGRESS NOTES
Psych IOP  Group note  Observations:    Engaged in Activity / Process and Self -disclosed: Yes  Applies Topic to self: Yes  Able to give Constructive Feedback: Yes  Affect: blunted  Degree of Insightful Thinking 5  Notes:  Pt used markers on paper as complied with the [employer] art therapy task. Pt focused on the task and shared when encouraged. Pt described self as a social butterfly and related to peer about this. Pt described life in a sorority as the . Pt was unsure of how the created images related to self and was still able to describe the different aspects of self. Pt shared feeling safe in the group.       Lion Barraza LPAT

## 2024-05-01 NOTE — PROGRESS NOTES
Mental Health Awareness Class   (10:45am-11:45am)  Information/activity     The Twelve Steps for the Rest of Us    Instructor Patricia Dickerson LCSW     16:22 EDT     Patient Response Quiet  Patient was quiet but attentive.

## 2024-05-01 NOTE — PROGRESS NOTES
Mood at 1 with 10 being the worse and found the class on the Twelve Steps the most helpful.  Symptoms include:     Easily distracted    No SI.  Appropriate goals.

## 2024-05-02 ENCOUNTER — OFFICE VISIT (OUTPATIENT)
Dept: PSYCHIATRY | Facility: HOSPITAL | Age: 19
End: 2024-05-02
Payer: COMMERCIAL

## 2024-05-02 DIAGNOSIS — F33.1 MAJOR DEPRESSIVE DISORDER, RECURRENT EPISODE, MODERATE: Primary | ICD-10-CM

## 2024-05-02 PROCEDURE — S9480 INTENSIVE OUTPATIENT PSYCHIA: HCPCS | Performed by: ART THERAPIST

## 2024-05-02 NOTE — PROGRESS NOTES
Psych IOP  Group note  Observations:    Engaged in Activity / Process and Self -disclosed: Yes  Applies Topic to self: Yes  Able to give Constructive Feedback: Yes  Affect: blunted  Degree of Insightful Thinking 4  Notes:  Pt shared in the group discussion when encouraged. Pt shared of challenges with getting close to friends as has the history of moving often when growing up. Pt did described one friend pt considers a sister because they are so close and continue to have good contact. Pt related to peer as they described having a schedule to help with possible down time. Pt described liking working after IOP and hopes with work more over the weekend.       Lion Barraza, LPAT

## 2024-05-02 NOTE — PROGRESS NOTES
Teaching Record:  Information / activity:  Alcohol Education, Process Addictions, and Marijuana Education    Moderate participation   [id number]      Sofia Smith, JOHNW

## 2024-05-02 NOTE — PROGRESS NOTES
S: The patient is now enrolled in the intensive outpatient program after having been seen by this physician in the main hospital.  She is bright and euthymic when seen today and offers no new complaints.  She reports that she is benefiting greatly from her group participation.    O: Alert and oriented all spheres.  Mood euthymic affect incurrent.  Speech: Coherent.  No suicidal or homicidal ideation psychotic features.  Judgement and insight intact    A: Major depression disorder single episode moderate    P: Continuing current treatment and participation in IOP.

## 2024-05-02 NOTE — PROGRESS NOTES
Mood at 1 with 10 being the worse and shared having a different perspective was most helpful today.    Symptoms include:    Easily distracted  An increase/decrease in normal appetite  Changes in normal sleep patterns (increase, decrease, or broken hoours of sleep)    Denied SI.  Shared appropriate goals. Plans to return to [facility] Friday, May 3.

## 2024-05-03 ENCOUNTER — OFFICE VISIT (OUTPATIENT)
Dept: PSYCHIATRY | Facility: HOSPITAL | Age: 19
End: 2024-05-03
Payer: COMMERCIAL

## 2024-05-03 DIAGNOSIS — F33.1 MAJOR DEPRESSIVE DISORDER, RECURRENT EPISODE, MODERATE: Primary | ICD-10-CM

## 2024-05-03 PROCEDURE — S9480 INTENSIVE OUTPATIENT PSYCHIA: HCPCS | Performed by: SOCIAL WORKER

## 2024-05-03 NOTE — PROGRESS NOTES
Other     Information/activity     [id number] Forgiveness and Trust - Watched the videos Forgiveness is no the same as reconciliation, and Sidra Juarez reveals the 7 secrets of trust: An absolute must-see speech! And gave the handout BRAVING- The anatomy of trust by Sidra Juarez; processed with the group    In[facility], [facility]     11:25 EDT     Patient Response Good participation  Pt focused on the topic, shared of ways to deal with forgiveness and trust this weekend and related to peers.
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
## 2024-05-03 NOTE — PROGRESS NOTES
Mood at 1 with 10 being the worse and shared the group interactions was most helpful today.    Symptoms include:    Easily distracted  An increase/decrease in normal appetite  Changes in normal sleep patterns (increase, decrease, or broken hoours of sleep)    Denied SI.  Shared appropriate goals. Plans to return to [facility] Monday, May 6.

## 2024-05-06 ENCOUNTER — OFFICE VISIT (OUTPATIENT)
Dept: PSYCHIATRY | Facility: HOSPITAL | Age: 19
End: 2024-05-06
Payer: COMMERCIAL

## 2024-05-06 DIAGNOSIS — F33.1 MAJOR DEPRESSIVE DISORDER, RECURRENT EPISODE, MODERATE: Primary | ICD-10-CM

## 2024-05-06 PROCEDURE — S9480 INTENSIVE OUTPATIENT PSYCHIA: HCPCS | Performed by: SOCIAL WORKER

## 2024-05-07 ENCOUNTER — OFFICE VISIT (OUTPATIENT)
Dept: PSYCHIATRY | Facility: HOSPITAL | Age: 19
End: 2024-05-07
Payer: COMMERCIAL

## 2024-05-07 DIAGNOSIS — F33.1 MAJOR DEPRESSIVE DISORDER, RECURRENT EPISODE, MODERATE: Primary | ICD-10-CM

## 2024-05-07 PROCEDURE — S9480 INTENSIVE OUTPATIENT PSYCHIA: HCPCS | Performed by: SOCIAL WORKER

## 2024-05-08 ENCOUNTER — OFFICE VISIT (OUTPATIENT)
Dept: PSYCHIATRY | Facility: HOSPITAL | Age: 19
End: 2024-05-08
Payer: COMMERCIAL

## 2024-05-08 DIAGNOSIS — F33.1 MAJOR DEPRESSIVE DISORDER, RECURRENT EPISODE, MODERATE: Primary | ICD-10-CM

## 2024-05-08 PROCEDURE — S9480 INTENSIVE OUTPATIENT PSYCHIA: HCPCS | Performed by: SOCIAL WORKER

## 2024-05-09 ENCOUNTER — OFFICE VISIT (OUTPATIENT)
Dept: PSYCHIATRY | Facility: HOSPITAL | Age: 19
End: 2024-05-09
Payer: COMMERCIAL

## 2024-05-09 DIAGNOSIS — F33.1 MAJOR DEPRESSIVE DISORDER, RECURRENT EPISODE, MODERATE: Primary | ICD-10-CM

## 2024-05-09 PROCEDURE — S9480 INTENSIVE OUTPATIENT PSYCHIA: HCPCS | Performed by: SOCIAL WORKER

## 2024-05-10 ENCOUNTER — OFFICE VISIT (OUTPATIENT)
Dept: PSYCHIATRY | Facility: HOSPITAL | Age: 19
End: 2024-05-10
Payer: COMMERCIAL

## 2024-05-10 DIAGNOSIS — F33.1 MAJOR DEPRESSIVE DISORDER, RECURRENT EPISODE, MODERATE: Primary | ICD-10-CM

## 2024-05-10 PROCEDURE — S9480 INTENSIVE OUTPATIENT PSYCHIA: HCPCS | Performed by: SOCIAL WORKER

## 2024-05-13 ENCOUNTER — OFFICE VISIT (OUTPATIENT)
Dept: PSYCHIATRY | Facility: HOSPITAL | Age: 19
End: 2024-05-13
Payer: COMMERCIAL

## 2024-05-13 DIAGNOSIS — F33.1 MAJOR DEPRESSIVE DISORDER, RECURRENT EPISODE, MODERATE: Primary | ICD-10-CM

## 2024-05-13 PROCEDURE — S9480 INTENSIVE OUTPATIENT PSYCHIA: HCPCS | Performed by: SOCIAL WORKER

## 2024-05-13 NOTE — PROGRESS NOTES
Mood at 1 with 10 being the worse and found just listening to be most helpful today.     Easily distracted  An increase/decrease in normal appetite  Changes in normal sleep patterns (increase, decrease, or broken hoours of sleep)  Racing thoughts    No SI and appropriate goals for the day. Pt needs a referral for a psychiatrist.

## 2024-05-13 NOTE — PROGRESS NOTES
Mental Health Awareness Class   (0416-0716)  Information/activity     Stress Management Toolkit    Instructor Patricia Dickerson LCSW     11:03 EDT     Patient Response Quiet   Patient needed to be reminded to put her phone away during class.  Sometimes it is difficult to tell if she is attending to the class discussion.

## 2024-05-13 NOTE — PROGRESS NOTES
Psych IOP  Group note  Observations:    Engaged in Activity / Process and Self -disclosed: No  Applies Topic to self: No  Able to give Constructive Feedback: No  Affect:  appropriate to situation   Degree of Insightful Thinking 6  Notes:  Pt did not request time for self today and noted feeling tired from a busy work weekend. Pt listened quietly to peers.     JOHN CisnerosW

## 2024-05-14 ENCOUNTER — OFFICE VISIT (OUTPATIENT)
Dept: PSYCHIATRY | Facility: HOSPITAL | Age: 19
End: 2024-05-14
Payer: COMMERCIAL

## 2024-05-14 DIAGNOSIS — F33.1 MAJOR DEPRESSIVE DISORDER, RECURRENT EPISODE, MODERATE: Primary | ICD-10-CM

## 2024-05-14 PROCEDURE — S9480 INTENSIVE OUTPATIENT PSYCHIA: HCPCS | Performed by: SOCIAL WORKER

## 2024-05-14 NOTE — PROGRESS NOTES
Mood at 2 with 10 being the worse and found listening and getting feedback to be most helpful today.     Easily distracted  An increase/decrease in normal appetite  Changes in normal sleep patterns (increase, decrease, or broken hoours of sleep)  Racing thoughts    No SI and appropriate goals for the day.

## 2024-05-14 NOTE — PROGRESS NOTES
Mental Health Awareness Class   ([id number])  Information/activity     Managing Anger    Instructor Patricia Dickerson LCSW     13:22 EDT     Patient Response Quiet   Did not participate in class discussion. Appeared distracted.

## 2024-05-14 NOTE — PROGRESS NOTES
Progress note:  Gave patient referrals for med management following discharge:  [facility], [facility], and [facility].  Also, for ongoing outpatient therapy:  [facility] and [facility].  Will also encourage patient to try [facility]  and to return for continuing care.
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
## 2024-05-15 ENCOUNTER — OFFICE VISIT (OUTPATIENT)
Dept: PSYCHIATRY | Facility: HOSPITAL | Age: 19
End: 2024-05-15
Payer: COMMERCIAL

## 2024-05-15 DIAGNOSIS — F33.1 MAJOR DEPRESSIVE DISORDER, RECURRENT EPISODE, MODERATE: Primary | ICD-10-CM

## 2024-05-15 PROCEDURE — S9480 INTENSIVE OUTPATIENT PSYCHIA: HCPCS | Performed by: SOCIAL WORKER

## 2024-05-15 NOTE — PROGRESS NOTES
IOP  Group note  Observations:    Engaged in Activity / Process and Self -disclosed: Yes  Applies Topic to self: Yes  Able to give Constructive Feedback: No  Affect: flat  Degree of Insightful Thinking 6  Notes:  Difficult to assess degree of insight as patient only shared when called upon and had seemed very distracted prior to.  She will state that she likes group and finds it helpful but she often appears distracted.      Patricia Dickerson, LCSW

## 2024-05-15 NOTE — PROGRESS NOTES
Mood at 1 with 10 being the worse and found listening to be most helpful today.     Easily distracted  An increase/decrease in normal appetite  Changes in normal sleep patterns (increase, decrease, or broken hoours of sleep)  Racing thoughts    No SI and appropriate goals for the day. Therapist spoke with pt about being more attentive in groups and classes and not working on crosswords or being on phone. Pt shared understanding of this.

## 2024-05-15 NOTE — PROGRESS NOTES
[id number] Psych IOP Class    Class topic: self-forgiveness and letting go of regret     Class participation: moderate; pt appeared to be listening and following along at times but also working on a crossword puzzle at times. Pt did not contribute to group discussion on topic. Therapist spoke with pt about this at wrap-up.

## 2024-05-16 ENCOUNTER — OFFICE VISIT (OUTPATIENT)
Dept: PSYCHIATRY | Facility: HOSPITAL | Age: 19
End: 2024-05-16
Payer: COMMERCIAL

## 2024-05-16 DIAGNOSIS — F33.1 MAJOR DEPRESSIVE DISORDER, RECURRENT EPISODE, MODERATE: Primary | ICD-10-CM

## 2024-05-16 PROCEDURE — S9480 INTENSIVE OUTPATIENT PSYCHIA: HCPCS | Performed by: SOCIAL WORKER

## 2024-05-16 NOTE — PROGRESS NOTES
Psych IOP  Group note  Observations:    Engaged in Activity / Process and Self -disclosed: Yes  Applies Topic to self: No  Able to give Constructive Feedback: No  Affect:  consistent with mood  Degree of Insightful Thinking 6  Notes:  Pt did not request time for self today but seemed more attentive. Pt mostly listened quietly.       Kimberly Kelley LCSW

## 2024-05-16 NOTE — PROGRESS NOTES
S: The patient appears to be sustaining progress.  She is bright euthymic and reports positive response to medication as well as group participation.    O: Awake alert and oriented all spheres.  Mood euthymic affect congruent.  Speech relevant and coherent.  No suicidal or homicidal ideation or psychotic features.  Judgement and insight intact    A: Major depressive disorder single episode, mild    P: Continuing current pharmacotherapy and group participation.  The patient may consider follow-up with this physician at [facility].

## 2024-05-16 NOTE — PROGRESS NOTES
Mood at 1 with 10 being the worse and found starting out with a positive outlook the most helpful.  Symptoms include:    Easily distracted  An increase/decrease in normal appetite  Changes in normal sleep patterns (increase, decrease, or broken hoours of sleep)  Racing thoughts    No SI.  Appropriate goals.

## 2024-05-16 NOTE — PROGRESS NOTES
Mental Health Awareness Class   ([id number])  Information/activity     Assertive Communication    Instructor Patricia Dickerson LCSW     10:48 EDT     Patient Response Quiet  Patient was quiet but more attentive today.

## 2024-05-17 ENCOUNTER — OFFICE VISIT (OUTPATIENT)
Dept: PSYCHIATRY | Facility: HOSPITAL | Age: 19
End: 2024-05-17
Payer: COMMERCIAL

## 2024-05-17 DIAGNOSIS — F33.1 MAJOR DEPRESSIVE DISORDER, RECURRENT EPISODE, MODERATE: Primary | ICD-10-CM

## 2024-05-17 PROCEDURE — S9480 INTENSIVE OUTPATIENT PSYCHIA: HCPCS | Performed by: SOCIAL WORKER

## 2024-05-17 NOTE — PROGRESS NOTES
" Psych IOP  Group note  Observations:    Engaged in Activity / Process and Self -disclosed: Yes  Applies Topic to self: Yes  Able to give Constructive Feedback: Yes  Affect:  appropriate to situation   Degree of Insightful Thinking 7  Notes:  Pt engaged and attentive to peers. Pt shared of doing pretty well today and plans for the day and weekend. Pt excited about getting a new tattoo and shared her process of being intentional about what she wants and taking her time getting it. Pt shared of her \"one day at a time\" tattoo and how it is a helpful affirmation for her. Therapist inquired if pt had thought any more about writing the letter to her dad that she may or may not send and pt shared thinking about this and feeling like she did not have the emotional energy to put into it right now. Pt receptive to support and validation from group.       Kimberly Kelley LCSW   "

## 2024-05-17 NOTE — PROGRESS NOTES
Mood at 1 with 10 being the worse and found talking and listening to be most helpful today.     Easily distracted  An increase/decrease in normal appetite  Changes in normal sleep patterns (increase, decrease, or broken hoours of sleep)  Racing thoughts    No SI and appropriate goals for the day. Pt may be out Monday but will call if so.

## 2024-05-17 NOTE — PROGRESS NOTES
Teaching Record:  Information / activity:  Stress Management in Recovery    Good participation   [id number]      JOHN MedinaW

## 2024-05-21 ENCOUNTER — OFFICE VISIT (OUTPATIENT)
Dept: PSYCHIATRY | Facility: HOSPITAL | Age: 19
End: 2024-05-21
Payer: COMMERCIAL

## 2024-05-21 DIAGNOSIS — F33.1 MAJOR DEPRESSIVE DISORDER, RECURRENT EPISODE, MODERATE: Primary | ICD-10-CM

## 2024-05-21 PROCEDURE — S9480 INTENSIVE OUTPATIENT PSYCHIA: HCPCS | Performed by: SOCIAL WORKER

## 2024-05-21 NOTE — PROGRESS NOTES
Psych IOP  Group note  Observations:    Engaged in Activity / Process and Self -disclosed: Yes  Applies Topic to self: Yes  Able to give Constructive Feedback: Yes  Affect:  appropriate to situation   Degree of Insightful Thinking 8  Notes:  Pt quiet at times in group but attentive. Pt shared how the progress she had observed in a peer discharging today and how it had helped her in her own process. Pt discussed work going well and bonding with her cat. Pt shared feeling tired today after a long work day yesterday.       JOHN CisnerosW

## 2024-05-21 NOTE — PROGRESS NOTES
Mental Health Awareness Class   ([id number])  Information/activity     Grief and Loss    Instructor Patricia Dickerson LCSW     10:44 EDT     Patient Response Good participation  Patient shared the varied reactions of others to her suicide attempt.

## 2024-05-21 NOTE — PROGRESS NOTES
Mood at 1 with 10 being the worse and found listening and talking the most helpful.  Symptoms include:    Easily distracted  An increase/decrease in normal appetite  Changes in normal sleep patterns (increase, decrease, or broken hoours of sleep)    No SI.  Appropriate goals.  Patient did a good job in program today.  Discussed possible discharge next Friday.

## 2024-05-22 ENCOUNTER — OFFICE VISIT (OUTPATIENT)
Dept: PSYCHIATRY | Facility: HOSPITAL | Age: 19
End: 2024-05-22
Payer: COMMERCIAL

## 2024-05-22 DIAGNOSIS — F33.1 MAJOR DEPRESSIVE DISORDER, RECURRENT EPISODE, MODERATE: Primary | ICD-10-CM

## 2024-05-22 PROCEDURE — S9480 INTENSIVE OUTPATIENT PSYCHIA: HCPCS | Performed by: SOCIAL WORKER

## 2024-05-22 NOTE — PROGRESS NOTES
Mood at 1 with 10 being the worse and found listening to be most helpful today.     Easily distracted  An increase/decrease in normal appetite  Changes in normal sleep patterns (increase, decrease, or broken hoours of sleep)  Racing thoughts    No SI and appropriate goals for the day.

## 2024-05-22 NOTE — PROGRESS NOTES
"[id number] Psych IOP Class    Class topic: emotional health; viewing of [facility] talk \"How to not take things personally\" followed by reading and discussion.     Class participation: good; pt mostly listened quietly but was attentive.   "

## 2024-05-22 NOTE — PROGRESS NOTES
IOP  Group note  Observations:    Engaged in Activity / Process and Self -disclosed: Yes  Applies Topic to self: Yes  Able to give Constructive Feedback: Yes  Affect: bright  Degree of Insightful Thinking 7  Notes:  Patient did a good job in group sharing about setting boundaries with relatives that she does not feel comfortable sharing sensitive information with.  That she can have a relationship with her grandparents without telling them everything.  She was giving support to another group member.      JOHN GamingW

## 2024-05-23 ENCOUNTER — OFFICE VISIT (OUTPATIENT)
Dept: PSYCHIATRY | Facility: HOSPITAL | Age: 19
End: 2024-05-23
Payer: COMMERCIAL

## 2024-05-23 DIAGNOSIS — F33.1 MAJOR DEPRESSIVE DISORDER, RECURRENT EPISODE, MODERATE: Primary | ICD-10-CM

## 2024-05-23 PROCEDURE — S9480 INTENSIVE OUTPATIENT PSYCHIA: HCPCS | Performed by: SOCIAL WORKER

## 2024-05-23 NOTE — PROGRESS NOTES
Psych IOP  Group note  Observations:    Engaged in Activity / Process and Self -disclosed: Yes  Applies Topic to self: Yes  Able to give Constructive Feedback: Yes  Affect:  appropriate to situation   Degree of Insightful Thinking 7  Notes:  Pt quiet at times but mostly attentive. Pt did share later feeling somewhat distracted by a work text. Pt related to peer in times of struggling with motivation and how to get things done and shared that making lists with small goals has been helpful for her, in addition to not creating a list that would feel unmanageable or create more pressure. Peer receptive to feedback. Pt also noted poor sleep last night because of her cat but doing ok otherwise.       Kimberly Kelley, JOHNW

## 2024-05-23 NOTE — PROGRESS NOTES
S: The patient remains in good spirits and offers no complaints.  She plans to participate in programming for 1 more week and then will be followed by this physician at [facility]    O: Awake alert and oriented all spheres.  Mood euthymic affect congruent.  Speech relevant and coherent.  No suicidal or homicidal ideation or psychotic features judgement and insight intact    A: Depressive disorder single episode moderate    P: Continuing current treatment and follow-up as described previously

## 2024-05-23 NOTE — PROGRESS NOTES
Mood at 1 with 10 being the worse and found listening and talking to be most helpful today.     Easily distracted  An increase/decrease in normal appetite  Changes in normal sleep patterns (increase, decrease, or broken hoours of sleep)  Racing thoughts    No SI and appropriate goals for the day. Pt will be back tomorrow.

## 2024-05-23 NOTE — PROGRESS NOTES
Mental Health Awareness Class   ([id number])  Information/activity     Finding Balance    Instructor Patricia Dickerson LCSW     10:45 EDT     Patient Response Good participation  Patient was engaged in class exercise and discussion.

## 2024-05-24 ENCOUNTER — OFFICE VISIT (OUTPATIENT)
Dept: PSYCHIATRY | Facility: HOSPITAL | Age: 19
End: 2024-05-24
Payer: COMMERCIAL

## 2024-05-24 DIAGNOSIS — F33.1 MAJOR DEPRESSIVE DISORDER, RECURRENT EPISODE, MODERATE: Primary | ICD-10-CM

## 2024-05-24 PROCEDURE — S9480 INTENSIVE OUTPATIENT PSYCHIA: HCPCS | Performed by: SOCIAL WORKER

## 2024-05-24 NOTE — PROGRESS NOTES
Mood at 1 with 10 being the worse and found listening and talking to be most helpful today.     Easily distracted  An increase/decrease in normal appetite  Changes in normal sleep patterns (increase, decrease, or broken hoours of sleep)    No SI and appropriate goals for the day. Pt will be back next Tuesday.

## 2024-05-24 NOTE — PLAN OF CARE
Pt has had 19 days in IOP for depression, anxiety, and previous SI with an attempt. Pt engages well in groups and classes and shows insight into self. Pt has been working on coping skills of spending time with her cat, having positive shifts at work and interactions with co-workers, and opening up to her supports. Pt has been seeing Dr. Terrazas and has been compliant with medications. Pt will follow him to [facility] for aftercare. Pt has been given referrals for individual therapy and is working to make an appt before discharge. Pt has been reporting no SI. Pt is planning to discharge next Friday, 5/31/24. Treatment team will continue to provide support and monitor for safety.

## 2024-05-24 NOTE — PROGRESS NOTES
Psych IOP  Group note  Observations:    Engaged in Activity / Process and Self -disclosed: Yes  Applies Topic to self: Yes  Able to give Constructive Feedback: No  Affect:  appropriate to situation   Degree of Insightful Thinking 6  Notes:  Pt shared about her week and feelings about approaching discharge. Pt expressed some nervousness about it but also feeling ready and much improved in mood and daily functioning. Pt identified feeling more balanced in general and felt this was a good sign of progress. Pt encouraged to come to continuing care group when able. Pt listened quietly as others shared.       JOHN CisnerosW

## 2024-05-28 ENCOUNTER — OFFICE VISIT (OUTPATIENT)
Dept: PSYCHIATRY | Facility: HOSPITAL | Age: 19
End: 2024-05-28
Payer: COMMERCIAL

## 2024-05-28 DIAGNOSIS — F33.1 MAJOR DEPRESSIVE DISORDER, RECURRENT EPISODE, MODERATE: Primary | ICD-10-CM

## 2024-05-28 PROCEDURE — S9480 INTENSIVE OUTPATIENT PSYCHIA: HCPCS | Performed by: SOCIAL WORKER

## 2024-05-28 NOTE — PROGRESS NOTES
Mental Health Awareness Class   ([id number])  Information/activity     FAQs    Instructor Patricia Dickerson LCSW     10:51 EDT     Patient Response Good participation  Patient shared her experience with the group, contributing to the topic.

## 2024-05-28 NOTE — PROGRESS NOTES
Mood at 1 with 10 being the worse and shared listening and talking has been most helpful today.    Symptoms include:    Easily distracted  An increase/decrease in normal appetite  Changes in normal sleep patterns (increase, decrease, or broken hoours of sleep)    Denied SI.  Shared appropriate goals. Plans to return to [facility] Wednesday, May 29.

## 2024-05-28 NOTE — PROGRESS NOTES
Psych IOP  Group note  Observations:    Engaged in Activity / Process and Self -disclosed: Yes  Applies Topic to self: No  Able to give Constructive Feedback: Yes  Affect:  appropriate to situation   Degree of Insightful Thinking 7  Notes:  Pt did not request time for self today but listened and supported others. Pt highlighted the progress of a peer over the weekend.       JOHN CisnerosW

## 2024-05-29 ENCOUNTER — OFFICE VISIT (OUTPATIENT)
Dept: PSYCHIATRY | Facility: HOSPITAL | Age: 19
End: 2024-05-29
Payer: COMMERCIAL

## 2024-05-29 DIAGNOSIS — F33.1 MAJOR DEPRESSIVE DISORDER, RECURRENT EPISODE, MODERATE: Primary | ICD-10-CM

## 2024-05-29 PROCEDURE — S9480 INTENSIVE OUTPATIENT PSYCHIA: HCPCS | Performed by: SOCIAL WORKER

## 2024-05-29 NOTE — PROGRESS NOTES
Mood at 1 with 10 being the worse and found listening and talking the most helpful.  Symptoms include:    Easily distracted  An increase/decrease in normal appetite  Changes in normal sleep patterns (increase, decrease, or broken hoours of sleep)  Racing thoughts    No SI.  Appropriate goals.  Patient plans to discharge on Friday.  She asked that I call her mother about insurance and follow up.

## 2024-05-29 NOTE — PROGRESS NOTES
Psych IOP  Group note  Observations:    Engaged in Activity / Process and Self -disclosed: Yes  Applies Topic to self: Yes  Able to give Constructive Feedback: Yes  Affect: blunted and bright  Degree of Insightful Thinking 6  Notes:  Pt was active in the discussion with the group. Pt described seeing change in self over the past several weeks and described self as stronger and growing. Pt also related to peer about depression. Pt described own self-harm and feeling need to hide places where cut until showed it to friends who wanted to support pt. Pt also offered peer encouragement to accept support of friends.       Lion Barraza LPAT

## 2024-05-29 NOTE — PROGRESS NOTES
[id number] Psych IOP Class    Class topic: Boundaries    Class participation: good; pt engaged and taking notes; pt insightful in relating to topic and sharing with peers.

## 2024-05-30 ENCOUNTER — OFFICE VISIT (OUTPATIENT)
Dept: PSYCHIATRY | Facility: HOSPITAL | Age: 19
End: 2024-05-30
Payer: COMMERCIAL

## 2024-05-30 DIAGNOSIS — F33.1 MAJOR DEPRESSIVE DISORDER, RECURRENT EPISODE, MODERATE: Primary | ICD-10-CM

## 2024-05-30 PROCEDURE — S9480 INTENSIVE OUTPATIENT PSYCHIA: HCPCS | Performed by: SOCIAL WORKER

## 2024-05-30 NOTE — PROGRESS NOTES
Mood at 1-2 with 10 being the worse and found listening, talking, and receiving feedback to be most helpful today.     Easily distracted  An increase/decrease in normal appetite  Changes in normal sleep patterns (increase, decrease, or broken hoours of sleep)  Increased nervous feelings/anxiety  Racing thoughts    No SI and appropriate goals for the day. Pt is discharging tomorrow.

## 2024-05-30 NOTE — PROGRESS NOTES
Other     Information/activity     [id number] Art Therapy - Bridge Drawing - Used markers on paper to create images of a bridge between visual representation of life in depression and hopes for life in recovery from depression; marked own progress on the bridge; wrote title for the image; shared with the group    Instructor CLARA Mendoza     11:27 EDT     Patient Response Good participation  Pt focused on the task and shared with the group. Pt described knowing the the progress is back and forth and expects good and bad days even when not depressed. Pt related to peers.

## 2024-05-31 ENCOUNTER — OFFICE VISIT (OUTPATIENT)
Dept: PSYCHIATRY | Facility: HOSPITAL | Age: 19
End: 2024-05-31
Payer: COMMERCIAL

## 2024-05-31 DIAGNOSIS — F32.0 CURRENT MILD EPISODE OF MAJOR DEPRESSIVE DISORDER WITHOUT PRIOR EPISODE: Primary | ICD-10-CM

## 2024-05-31 PROCEDURE — S9480 INTENSIVE OUTPATIENT PSYCHIA: HCPCS | Performed by: SOCIAL WORKER

## 2024-05-31 NOTE — PROGRESS NOTES
Other     Information/activity     1706-2977 The Brain - Used handouts of [facility] PET scan of the brain, and Time Out as discussed the brain functions, depression, emotions and coping skills    Instructor CLARA Mendoza     12:34 EDT     Patient Response Good participation  Pt was engaged, attentive and shared in reading the handout aloud.

## 2024-05-31 NOTE — PROGRESS NOTES
Mood at 1 with 10 being the worse and shared the interactions and getting feedback was most helpful today.    Symptoms include:    Easily distracted  An increase/decrease in normal appetite  Changes in normal sleep patterns (increase, decrease, or broken hoours of sleep)    Denied SI.  Shared appropriate goals. Pt is discharging from [facility] today and plans to follow-up with out pt providers.

## 2024-05-31 NOTE — PROGRESS NOTES
Psych IOP  Group note  Observations:    Engaged in Activity / Process and Self -disclosed: Yes  Applies Topic to self: Yes  Able to give Constructive Feedback: Yes  Affect:  appropriate to situation; calm   Degree of Insightful Thinking 8  Notes:  Pt engaged in group. Pt shared of discharge and plans for work and school. Pt noted recent interactions with her mom and sought feedback for how to manage her mother's concern and slight tendency to hover and check in. Pt receptive to feedback from peer about ways to communicate so her mother feels more assured that she'll tell her if something is wrong. Pt also receptive to positive feedback about her time in group and encouragement for the future.       Kimberly Kelley, JOHNW

## 2024-05-31 NOTE — PROGRESS NOTES
S: The patient continues to benefit from her participation in the intensive outpatient program.  She is bright euthymic and reports significant improvement in mood.    O: Alert and oriented all spheres.  Mood euthymic affect full range.  Speech relevant and coherent.  No suicidal or homicidal ideation or psychotic features.  Judgement and insight intact.    A: Major depressive disorder single episode mild improving    P: Continuing current treatment.  The patient will be followed by this physician at [facility] upon completion of IOP.

## 2024-05-31 NOTE — PROGRESS NOTES
Discharge Summary    Marva attended  24 Sessions         Registration Date 4/26/2024  Discharge Date  5/31/2024       Summary of Treatment and Progress towards goals:   Pt presented to [facility] with depression and anxiety. Pt had recently made a suicide attempt and had been in the main hospital. Pt was able to engage in group and classes and shared personal experiences with depression, anxiety, and trauma. Pt focused on coping skills of improving communication with her supports, opening up about her feelings, maintaining structure in her schedule, and taking care of her cat. At time of d/c, pt reported an improvement in mood and daily functioning, with no SI. Pt will continue to see Dr. Terrazas for med management and schedule therapy appointment with new provider.     Diagnostic Impression:   Major depressive disorder single episode mild improving     Medications:  Escitalopram 20 mg     Referrals/ Appointments :   Pt has been provided with referrals for individual therapy and is working to set an appointment. A referral for pt to follow Dr. Ta has been sent to [facility]. Pt and therapist will follow up with the office ([phone]) to establish if appointment has been set. Pt has also been encouraged to attend Continuing Care group when able on Wednesdays at 5 pm.     Kimberly Kelley LCSW